# Patient Record
Sex: FEMALE | URBAN - METROPOLITAN AREA
[De-identification: names, ages, dates, MRNs, and addresses within clinical notes are randomized per-mention and may not be internally consistent; named-entity substitution may affect disease eponyms.]

---

## 2020-06-02 ENCOUNTER — APPOINTMENT (RX ONLY)
Dept: URBAN - METROPOLITAN AREA CLINIC 61 | Facility: CLINIC | Age: 70
Setting detail: DERMATOLOGY
End: 2020-06-02

## 2020-06-02 DIAGNOSIS — L30.9 DERMATITIS, UNSPECIFIED: ICD-10-CM

## 2020-06-02 DIAGNOSIS — L29.8 OTHER PRURITUS: ICD-10-CM

## 2020-06-02 DIAGNOSIS — L21.8 OTHER SEBORRHEIC DERMATITIS: ICD-10-CM

## 2020-06-02 DIAGNOSIS — L29.89 OTHER PRURITUS: ICD-10-CM

## 2020-06-02 PROCEDURE — ? PRESCRIPTION

## 2020-06-02 PROCEDURE — ? INTRAMUSCULAR KENALOG

## 2020-06-02 PROCEDURE — ? BIOPSY BY PUNCH METHOD

## 2020-06-02 PROCEDURE — 96372 THER/PROPH/DIAG INJ SC/IM: CPT | Mod: 59

## 2020-06-02 PROCEDURE — ? COUNSELING

## 2020-06-02 PROCEDURE — 11104 PUNCH BX SKIN SINGLE LESION: CPT

## 2020-06-02 PROCEDURE — 99202 OFFICE O/P NEW SF 15 MIN: CPT | Mod: 25

## 2020-06-02 RX ORDER — KETOCONAZOLE 20 MG/G
CREAM TOPICAL BID
Qty: 1 | Refills: 3 | Status: ERX | COMMUNITY
Start: 2020-06-02

## 2020-06-02 RX ORDER — HYDROCORTISONE 25 MG/G
CREAM TOPICAL
Qty: 1 | Refills: 4 | Status: ERX | COMMUNITY
Start: 2020-06-02

## 2020-06-02 RX ORDER — TRIAMCINOLONE ACETONIDE 1 MG/G
CREAM TOPICAL BID
Qty: 1 | Refills: 1 | Status: ERX | COMMUNITY
Start: 2020-06-02

## 2020-06-02 RX ADMIN — HYDROCORTISONE: 25 CREAM TOPICAL at 00:00

## 2020-06-02 RX ADMIN — KETOCONAZOLE: 20 CREAM TOPICAL at 00:00

## 2020-06-02 RX ADMIN — TRIAMCINOLONE ACETONIDE: 1 CREAM TOPICAL at 00:00

## 2020-06-02 ASSESSMENT — LOCATION DETAILED DESCRIPTION DERM
LOCATION DETAILED: RIGHT VENTRAL DISTAL FOREARM
LOCATION DETAILED: LEFT CENTRAL MALAR CHEEK
LOCATION DETAILED: RIGHT POSTERIOR SHOULDER

## 2020-06-02 ASSESSMENT — PAIN INTENSITY VAS: HOW INTENSE IS YOUR PAIN 0 BEING NO PAIN, 10 BEING THE MOST SEVERE PAIN POSSIBLE?: NO PAIN

## 2020-06-02 ASSESSMENT — LOCATION ZONE DERM
LOCATION ZONE: FACE
LOCATION ZONE: ARM

## 2020-06-02 ASSESSMENT — LOCATION SIMPLE DESCRIPTION DERM
LOCATION SIMPLE: LEFT CHEEK
LOCATION SIMPLE: RIGHT FOREARM
LOCATION SIMPLE: RIGHT SHOULDER

## 2020-06-02 ASSESSMENT — SEVERITY ASSESSMENT
SEVERITY: MILD TO MODERATE
HOW SEVERE IS THIS PATIENT'S CONDITION?: MILD

## 2020-06-02 NOTE — PROCEDURE: INTRAMUSCULAR KENALOG
Add Option For Additional Mediation: No
Administered By (Optional): Twila
Total Volume (Ccs): 1
Kenalog Preparation: kenalog
Detail Level: None
Consent: The risks of atrophy were reviewed with the patient.
Concentration (Mg/Ml): 40.0
Concentration (Mg/Ml) Of Additional Medication: 2.5

## 2020-06-16 ENCOUNTER — APPOINTMENT (RX ONLY)
Dept: URBAN - METROPOLITAN AREA CLINIC 61 | Facility: CLINIC | Age: 70
Setting detail: DERMATOLOGY
End: 2020-06-16

## 2020-06-16 DIAGNOSIS — L20.89 OTHER ATOPIC DERMATITIS: ICD-10-CM | Status: INADEQUATELY CONTROLLED

## 2020-06-16 DIAGNOSIS — Z48.817 ENCOUNTER FOR SURGICAL AFTERCARE FOLLOWING SURGERY ON THE SKIN AND SUBCUTANEOUS TISSUE: ICD-10-CM

## 2020-06-16 PROBLEM — L20.84 INTRINSIC (ALLERGIC) ECZEMA: Status: ACTIVE | Noted: 2020-06-16

## 2020-06-16 PROCEDURE — ? FULL BODY SKIN EXAM - DECLINED

## 2020-06-16 PROCEDURE — 99213 OFFICE O/P EST LOW 20 MIN: CPT

## 2020-06-16 PROCEDURE — ? PATHOLOGY DISCUSSION

## 2020-06-16 PROCEDURE — ? COUNSELING

## 2020-06-16 PROCEDURE — 99024 POSTOP FOLLOW-UP VISIT: CPT

## 2020-06-16 PROCEDURE — ? POST-OP WOUND CHECK

## 2020-06-16 ASSESSMENT — LOCATION SIMPLE DESCRIPTION DERM: LOCATION SIMPLE: RIGHT FOREARM

## 2020-06-16 ASSESSMENT — LOCATION DETAILED DESCRIPTION DERM: LOCATION DETAILED: RIGHT VENTRAL PROXIMAL FOREARM

## 2020-06-16 ASSESSMENT — LOCATION ZONE DERM: LOCATION ZONE: ARM

## 2020-06-16 NOTE — PROCEDURE: POST-OP WOUND CHECK
Detail Level: Detailed
Add 06609 Cpt? (Important Note: In 2017 The Use Of 17912 Is Being Tracked By Cms To Determine Future Global Period Reimbursement For Global Periods): yes
Wound Evaluated By: Karen

## 2020-06-16 NOTE — PROCEDURE: COUNSELING
Detail Level: Detailed
Patient Specific Counseling (Will Not Stick From Patient To Patient): Rec. hypoallergenic personal care products. \\nPatch testing with Dr. Mathews.\\nContinue topical TAC as needed for itching.

## 2020-07-22 ENCOUNTER — APPOINTMENT (RX ONLY)
Dept: URBAN - METROPOLITAN AREA CLINIC 61 | Facility: CLINIC | Age: 70
Setting detail: DERMATOLOGY
End: 2020-07-22

## 2020-07-22 DIAGNOSIS — L259 CONTACT DERMATITIS AND OTHER ECZEMA, UNSPECIFIED CAUSE: ICD-10-CM | Status: INADEQUATELY CONTROLLED

## 2020-07-22 PROBLEM — L23.9 ALLERGIC CONTACT DERMATITIS, UNSPECIFIED CAUSE: Status: ACTIVE | Noted: 2020-07-22

## 2020-07-22 PROCEDURE — ? FULL BODY SKIN EXAM - DECLINED

## 2020-07-22 PROCEDURE — ? ADDITIONAL NOTES

## 2020-07-22 PROCEDURE — ? PRESCRIPTION MEDICATION MANAGEMENT

## 2020-07-22 PROCEDURE — 99214 OFFICE O/P EST MOD 30 MIN: CPT

## 2020-07-22 PROCEDURE — ? PATIENT SPECIFIC COUNSELING

## 2020-07-22 PROCEDURE — ? COUNSELING ALLERGENS

## 2020-07-22 PROCEDURE — ? FOLLOW UP FOR NEXT VISIT

## 2020-07-22 PROCEDURE — ? EDUCATIONAL RESOURCES PROVIDED

## 2020-07-22 ASSESSMENT — LOCATION SIMPLE DESCRIPTION DERM
LOCATION SIMPLE: CHEST
LOCATION SIMPLE: LEFT FOREARM
LOCATION SIMPLE: RIGHT FOREARM

## 2020-07-22 ASSESSMENT — LOCATION DETAILED DESCRIPTION DERM
LOCATION DETAILED: LEFT PROXIMAL DORSAL FOREARM
LOCATION DETAILED: UPPER STERNUM
LOCATION DETAILED: RIGHT DISTAL DORSAL FOREARM
LOCATION DETAILED: LEFT VENTRAL PROXIMAL FOREARM
LOCATION DETAILED: RIGHT VENTRAL DISTAL FOREARM

## 2020-07-22 ASSESSMENT — SEVERITY ASSESSMENT: SEVERITY: MILD

## 2020-07-22 ASSESSMENT — BSA RASH: BSA RASH: 12

## 2020-07-22 ASSESSMENT — LOCATION ZONE DERM
LOCATION ZONE: ARM
LOCATION ZONE: TRUNK

## 2020-07-22 NOTE — PROCEDURE: ADDITIONAL NOTES
Detail Level: Generalized
Additional Notes: Back is clear and can schedule patch testing
Additional Notes: Eruption is partially photo distributed - as such carvedilol may be marking the skin more reactive - Patient is advised to protect from sun exposure

## 2020-09-22 ENCOUNTER — APPOINTMENT (RX ONLY)
Dept: URBAN - METROPOLITAN AREA CLINIC 61 | Facility: CLINIC | Age: 70
Setting detail: DERMATOLOGY
End: 2020-09-22

## 2020-09-22 DIAGNOSIS — L259 CONTACT DERMATITIS AND OTHER ECZEMA, UNSPECIFIED CAUSE: ICD-10-CM

## 2020-09-22 PROBLEM — L23.9 ALLERGIC CONTACT DERMATITIS, UNSPECIFIED CAUSE: Status: ACTIVE | Noted: 2020-09-22

## 2020-09-22 PROCEDURE — ? ACDS EXTENDED PATCH TEST READING

## 2020-09-22 PROCEDURE — ? FULL BODY SKIN EXAM - DECLINED

## 2020-09-22 PROCEDURE — 95044 PATCH/APPLICATION TESTS: CPT

## 2020-09-22 PROCEDURE — ? PATCH TESTING

## 2020-09-22 PROCEDURE — ? ADDITIONAL NOTES

## 2020-09-22 NOTE — PROCEDURE: ACDS EXTENDED PATCH TEST READING
Allergen 85 Reaction: no reaction
Name Of Allergen 21: D-044C Diazolidinylurea (Germall II)
Name Of Allergen 33: P-019B  propylene glycol
Name Of Allergen 61: P-022 Propolis (Bee's wax)
Name Of Allergen 52: D-005B  dibucaine hydrochloride
Name Of Allergen 37: I-008C Iodopropynyl butyl carbamate
Name Of Allergen 19: B-001 Myroxylon pereirae resin (Balsam of [place])
Name Of Allergen 8: Mx-06 Carba Mix
Name Of Allergen 41: T-010 Toluenesulfonamide formaldehyde resin
Name Of Allergen 12: E-005 Ethylenediamine diHCl
Name Of Allergen 1: B-004 Benzocaine
Name Of Allergen 14: C-007B Quaternium 15 (Dowicil 200)
Name Of Allergen 5: I-001A Imidazolidinyl Urea (Germall 115)
Name Of Allergen 47: H-021B Hydrocortisone-17-butyrate
Name Of Allergen 32: MX-25 Fragrance Mix II
Name Of Allergen 22: D-047B DMDM Hydratoin (formaldehyde releaser)
Name Of Allergen 49: C-018 Cocamidopropylbetaine
Name Of Allergen 48: D-012 dimethylol dihydroxyethyleneurea (Fix CPN)
Name Of Allergen 13: E-002 Epoxy resin Bisphenol A
Name Of Allergen 7: A-004  Amerchol L 101 (Lanolin , wool alcohols)
Name Of Allergen 6: C-014 Cinnamal (Cinnamic aldehyde)
Show Negative Results In The Note?: Yes
Name Of Allergen 45: B-033B Budesonide
Name Of Allergen 65: Y-001 [place] odorata oil (Ylang-Ylang Oil)
Name Of Allergen 17: I-004 n-isopropyl-n-phenyl-4 phenylenediamine
Name Of Allergen 55: A-029 amidoamine
Name Of Allergen 30: B-015B 2-bromo-2-nitropropane (Bronopol)
Name Of Allergen 28: MX-07 Fragrance Mix
Name Of Allergen 59: B-008B  Benzyl alcohol
Name Of Allergen 20: N-0028  Nickel sulfate hexahydrate
Name Of Allergen 36: MX-16  ethyleneurea, melamine formaldehyde mix
Name Of Allergen 53: D-065 decyl glucoside
Name Of Allergen 15: B-024 3-urww-Eojpuxshimelrekbrenighx resin
Detail Level: Zone
Name Of Allergen 50: M-035B Methylisothiazolinone
Name Of Allergen 24: MX-24 Mixed  dialkyl thiourea
Name Of Allergen 3: C-020 Colophonium (Colophony)
Name Of Allergen 46: Mx-29A Compositae Mix
Name Of Allergen 11: F-0028 Formaldehyde
Name Of Allergen 56: H-010 HEMA (2-hydroxyethyl methacrylate)
Name Of Allergen 31: MX-18 Sequiterpene lactone mix
Name Of Allergen 38: MX-26 Disperse Blue 106/124 Mix
Name Of Allergen 58: L-003 Hydroxyisohexyl 3 cyclohexene carboxaldehyde (Lyral)
Name Of Allergen 16: MX-05B Mercapto Mix
Name Of Allergen 39: E-004 ethyl acrylate
What Reading Time Point?: 168 hour
Name Of Allergen 62: F-003 fusidic acid sodium salt
Name Of Allergen 44: T-031B Zoubkjvwre-07-lvbybkadl
Name Of Allergen 66: M-005 mercury
Name Of Allergen 4: P-006 p-Phenyenediamine (PPD)
Name Of Allergen 25: C-009B Methylisothiazolinone+ Methylchloroisothiazolinone (Uyen HUTCHISON )
Name Of Allergen 60: T-036 Tocopherol (DL alpha tocopherol (Vitamin E)
Number Of Patches Read: 67
Name Of Allergen 18: P-014B   potassium dichromate (Chrome)
Name Of Allergen 34: H-014C  Benzophenone-3/(2-hydroxy-4-methoxybenzophenone)
Name Of Allergen 35: C-101B Chloroxylenol (PCMX)
Name Of Allergen 43: C-017A  Cobalt (II) chloride hexahydrate
Name Of Allergen 9: N-001 Neomycin sulfate
Name Of Allergen 26: MX-03A  Paraben Mix
Name Of Allergen 67: T-007  thimerosal
Name Of Allergen 2: M-0038 2 Mercaptobenothiazole MBT
Name Of Allergen 64: T-035B  tea Tree Oil
Name Of Allergen 27: D-049E Methyldibromoglutaronitrile (MDBGN)
Name Of Allergen 51: L-002B Lidocaine
Name Of Allergen 10: MX-01 Zainab Mix
Name Of Allergen 54: C-028 Clobetasol-17-propionate
Name Of Allergen 29: G-003B Glutaral (glutaraldehyde)
Name Of Allergen 40: G-004 Glyceryl monothioglycolate
Name Of Allergen 23: B-032B  Bacitracin
Name Of Allergen 42: M-013 Methyl methacrylate
Name Of Allergen 57: D-057  Desoximethasone
Name Of Allergen 63: C-019 cocamide TANGELA (Coconut diethanolamide)

## 2020-09-22 NOTE — PROCEDURE: PATCH TESTING
Consent: Written consent obtained, risks reviewed including but not limited to rash, itching, allergic reaction, systemic rash, remote possiblity of anaphylaxis to allergen.
Post-Care Instructions: I reviewed with the patient in detail post-care instructions. Patient should not sweat, pick at, or get the patches wet for 48 hours.
Detail Level: Zone
Number Of Patches (Maximum Allowable Per Dos By Cms Is 90): 67

## 2020-09-24 ENCOUNTER — APPOINTMENT (RX ONLY)
Dept: URBAN - METROPOLITAN AREA CLINIC 61 | Facility: CLINIC | Age: 70
Setting detail: DERMATOLOGY
End: 2020-09-24

## 2020-09-24 DIAGNOSIS — L259 CONTACT DERMATITIS AND OTHER ECZEMA, UNSPECIFIED CAUSE: ICD-10-CM

## 2020-09-24 PROBLEM — L23.9 ALLERGIC CONTACT DERMATITIS, UNSPECIFIED CAUSE: Status: ACTIVE | Noted: 2020-09-24

## 2020-09-24 PROCEDURE — ? FULL BODY SKIN EXAM - DECLINED

## 2020-09-24 PROCEDURE — ? ADDITIONAL NOTES

## 2020-09-24 PROCEDURE — ? PATCH TEST REMOVAL

## 2020-09-24 PROCEDURE — ? ACDS EXTENDED PATCH TEST READING

## 2020-09-24 PROCEDURE — ? COUNSELING

## 2020-09-24 PROCEDURE — 99212 OFFICE O/P EST SF 10 MIN: CPT

## 2020-09-24 ASSESSMENT — LOCATION ZONE DERM: LOCATION ZONE: TRUNK

## 2020-09-24 ASSESSMENT — LOCATION SIMPLE DESCRIPTION DERM
LOCATION SIMPLE: LEFT UPPER BACK
LOCATION SIMPLE: RIGHT UPPER BACK

## 2020-09-24 ASSESSMENT — LOCATION DETAILED DESCRIPTION DERM
LOCATION DETAILED: RIGHT MID-UPPER BACK
LOCATION DETAILED: LEFT INFERIOR UPPER BACK

## 2020-09-24 NOTE — PROCEDURE: PATCH TEST REMOVAL
Patch Test Removal Text: The patch test material was removed from the back today. The patch test reading performed after 30 minute rest
Detail Level: None

## 2020-09-24 NOTE — PROCEDURE: ACDS EXTENDED PATCH TEST READING
Allergen 18 Reaction: no reaction
Name Of Allergen 25: C-009B Methylisothiazolinone+ Methylchloroisothiazolinone (Uyen HUTCHISON )
Name Of Allergen 37: I-008C Iodopropynyl butyl carbamate
Name Of Allergen 67: T-007  thimerosal
Name Of Allergen 13: E-002 Epoxy resin Bisphenol A
Name Of Allergen 45: B-033B Budesonide
Name Of Allergen 5: I-001A Imidazolidinyl Urea (Germall 115)
Name Of Allergen 26: MX-03A  Paraben Mix
Name Of Allergen 55: A-029 amidoamine
Name Of Allergen 63: C-019 cocamide TANGELA (Coconut diethanolamide)
Name Of Allergen 58: L-003 Hydroxyisohexyl 3 cyclohexene carboxaldehyde (Lyral)
Name Of Allergen 3: C-020 Colophonium (Colophony)
Name Of Allergen 20: N-0028  Nickel sulfate hexahydrate
Name Of Allergen 10: MX-01 Zainab Mix
Name Of Allergen 50: M-035B Methylisothiazolinone
Name Of Allergen 39: E-004 ethyl acrylate
Number Of Patches Read: 67
Name Of Allergen 54: C-028 Clobetasol-17-propionate
Name Of Allergen 35: C-101B Chloroxylenol (PCMX)
Name Of Allergen 33: P-019B  propylene glycol
Name Of Allergen 44: T-031B Xmkanmudsq-59-ftomvuart
Name Of Allergen 30: B-015B 2-bromo-2-nitropropane (Bronopol)
Name Of Allergen 24: MX-24 Mixed  dialkyl thiourea
Name Of Allergen 11: F-0028 Formaldehyde
Name Of Allergen 46: Mx-29A Compositae Mix
Name Of Allergen 62: F-003 fusidic acid sodium salt
Name Of Allergen 49: C-018 Cocamidopropylbetaine
Name Of Allergen 18: P-014B   potassium dichromate (Chrome)
Name Of Allergen 40: G-004 Glyceryl monothioglycolate
Name Of Allergen 57: D-057  Desoximethasone
Name Of Allergen 65: Y-001 [place] odorata oil (Ylang-Ylang Oil)
Name Of Allergen 1: B-004 Benzocaine
Name Of Allergen 38: MX-26 Disperse Blue 106/124 Mix
Name Of Allergen 52: D-005B  dibucaine hydrochloride
Name Of Allergen 43: C-017A  Cobalt (II) chloride hexahydrate
Name Of Allergen 60: T-036 Tocopherol (DL alpha tocopherol (Vitamin E)
Show Negative Results In The Note?: Yes
Name Of Allergen 8: Mx-06 Carba Mix
Name Of Allergen 34: H-014C  Benzophenone-3/(2-hydroxy-4-methoxybenzophenone)
Name Of Allergen 64: T-035B  tea Tree Oil
Name Of Allergen 42: M-013 Methyl methacrylate
Name Of Allergen 2: M-0038 2 Mercaptobenothiazole MBT
Name Of Allergen 15: B-024 7-ovez-Qnuzohcnonberhumfauiinr resin
Name Of Allergen 4: P-006 p-Phenyenediamine (PPD)
Name Of Allergen 51: L-002B Lidocaine
Name Of Allergen 48: D-012 dimethylol dihydroxyethyleneurea (Fix CPN)
Name Of Allergen 59: B-008B  Benzyl alcohol
Name Of Allergen 36: MX-16  ethyleneurea, melamine formaldehyde mix
Name Of Allergen 9: N-001 Neomycin sulfate
Name Of Allergen 19: B-001 Myroxylon pereirae resin (Balsam of [place])
Name Of Allergen 23: B-032B  Bacitracin
Name Of Allergen 16: MX-05B Mercapto Mix
Detail Level: Zone
Name Of Allergen 66: M-005 mercury
Name Of Allergen 6: C-014 Cinnamal (Cinnamic aldehyde)
Name Of Allergen 29: G-003B Glutaral (glutaraldehyde)
Name Of Allergen 7: A-004  Amerchol L 101 (Lanolin , wool alcohols)
Name Of Allergen 53: D-065 decyl glucoside
Name Of Allergen 47: H-021B Hydrocortisone-17-butyrate
Name Of Allergen 17: I-004 n-isopropyl-n-phenyl-4 phenylenediamine
Name Of Allergen 41: T-010 Toluenesulfonamide formaldehyde resin
Name Of Allergen 32: MX-25 Fragrance Mix II
Name Of Allergen 21: D-044C Diazolidinylurea (Germall II)
Name Of Allergen 61: P-022 Propolis (Bee's wax)
Name Of Allergen 14: C-007B Quaternium 15 (Dowicil 200)
What Reading Time Point?: 48 hour
Name Of Allergen 27: D-049E Methyldibromoglutaronitrile (MDBGN)
Name Of Allergen 12: E-005 Ethylenediamine diHCl
Name Of Allergen 28: MX-07 Fragrance Mix
Name Of Allergen 31: MX-18 Sequiterpene lactone mix
Name Of Allergen 22: D-047B DMDM Hydratoin (formaldehyde releaser)
Name Of Allergen 56: H-010 HEMA (2-hydroxyethyl methacrylate)

## 2020-09-25 ENCOUNTER — APPOINTMENT (RX ONLY)
Dept: URBAN - METROPOLITAN AREA CLINIC 61 | Facility: CLINIC | Age: 70
Setting detail: DERMATOLOGY
End: 2020-09-25

## 2020-09-25 DIAGNOSIS — L259 CONTACT DERMATITIS AND OTHER ECZEMA, UNSPECIFIED CAUSE: ICD-10-CM

## 2020-09-25 PROBLEM — L23.9 ALLERGIC CONTACT DERMATITIS, UNSPECIFIED CAUSE: Status: ACTIVE | Noted: 2020-09-25

## 2020-09-25 PROCEDURE — ? FULL BODY SKIN EXAM - DECLINED

## 2020-09-25 PROCEDURE — 99213 OFFICE O/P EST LOW 20 MIN: CPT

## 2020-09-25 PROCEDURE — ? COUNSELING

## 2020-09-25 PROCEDURE — ? ADDITIONAL NOTES

## 2020-09-25 PROCEDURE — ? ACDS EXTENDED PATCH TEST READING

## 2020-09-25 ASSESSMENT — LOCATION DETAILED DESCRIPTION DERM
LOCATION DETAILED: LEFT MID-UPPER BACK
LOCATION DETAILED: RIGHT MID-UPPER BACK

## 2020-09-25 ASSESSMENT — LOCATION SIMPLE DESCRIPTION DERM
LOCATION SIMPLE: LEFT UPPER BACK
LOCATION SIMPLE: RIGHT UPPER BACK

## 2020-09-25 ASSESSMENT — LOCATION ZONE DERM: LOCATION ZONE: TRUNK

## 2020-09-25 NOTE — PROCEDURE: ACDS EXTENDED PATCH TEST READING
Allergen 87 Reaction: no reaction
Name Of Allergen 47: H-021B Hydrocortisone-17-butyrate
Show Allergen Counseling In The Note?: Yes
Name Of Allergen 12: E-005 Ethylenediamine diHCl
Name Of Allergen 54: C-028 Clobetasol-17-propionate
Name Of Allergen 27: D-049E Methyldibromoglutaronitrile (MDBGN)
Name Of Allergen 63: C-019 cocamide TANGELA (Coconut diethanolamide)
Name Of Allergen 19: B-001 Myroxylon pereirae resin (Balsam of [place])
Name Of Allergen 61: P-022 Propolis (Bee's wax)
Name Of Allergen 34: H-014C  Benzophenone-3/(2-hydroxy-4-methoxybenzophenone)
Name Of Allergen 55: A-029 amidoamine
Name Of Allergen 56: H-010 HEMA (2-hydroxyethyl methacrylate)
Name Of Allergen 46: Mx-29A Compositae Mix
Name Of Allergen 8: Mx-06 Carba Mix
Name Of Allergen 1: B-004 Benzocaine
Name Of Allergen 62: F-003 fusidic acid sodium salt
Name Of Allergen 4: P-006 p-Phenyenediamine (PPD)
Name Of Allergen 17: I-004 n-isopropyl-n-phenyl-4 phenylenediamine
Name Of Allergen 43: C-017A  Cobalt (II) chloride hexahydrate
Name Of Allergen 51: L-002B Lidocaine
Name Of Allergen 23: B-032B  Bacitracin
Name Of Allergen 35: C-101B Chloroxylenol (PCMX)
Name Of Allergen 5: I-001A Imidazolidinyl Urea (Germall 115)
Name Of Allergen 21: D-044C Diazolidinylurea (Germall II)
Name Of Allergen 29: G-003B Glutaral (glutaraldehyde)
Name Of Allergen 66: M-005 mercury
Name Of Allergen 50: M-035B Methylisothiazolinone
Name Of Allergen 30: B-015B 2-bromo-2-nitropropane (Bronopol)
Name Of Allergen 37: I-008C Iodopropynyl butyl carbamate
Name Of Allergen 53: D-065 decyl glucoside
Name Of Allergen 28: MX-07 Fragrance Mix
Name Of Allergen 57: D-057  Desoximethasone
Name Of Allergen 33: P-019B  propylene glycol
Name Of Allergen 36: MX-16  ethyleneurea, melamine formaldehyde mix
Name Of Allergen 13: E-002 Epoxy resin Bisphenol A
Name Of Allergen 44: T-031B Rnsjvrryzu-82-txqvipkuf
Name Of Allergen 6: C-014 Cinnamal (Cinnamic aldehyde)
Name Of Allergen 9: N-001 Neomycin sulfate
Name Of Allergen 11: F-0028 Formaldehyde
Name Of Allergen 42: M-013 Methyl methacrylate
Name Of Allergen 48: D-012 dimethylol dihydroxyethyleneurea (Fix CPN)
Number Of Patches Read: 67
Name Of Allergen 7: A-004  Amerchol L 101 (Lanolin , wool alcohols)
Name Of Allergen 14: C-007B Quaternium 15 (Dowicil 200)
Allergen 27 Reaction: irritant reaction
Name Of Allergen 25: C-009B Methylisothiazolinone+ Methylchloroisothiazolinone (Uyen HUTCHISON )
Name Of Allergen 18: P-014B   potassium dichromate (Chrome)
Name Of Allergen 32: MX-25 Fragrance Mix II
Name Of Allergen 24: MX-24 Mixed  dialkyl thiourea
Name Of Allergen 10: MX-01 Zainab Mix
Name Of Allergen 22: D-047B DMDM Hydratoin (formaldehyde releaser)
Name Of Allergen 60: T-036 Tocopherol (DL alpha tocopherol (Vitamin E)
Name Of Allergen 2: M-0038 2 Mercaptobenothiazole MBT
Name Of Allergen 45: B-033B Budesonide
Name Of Allergen 16: MX-05B Mercapto Mix
What Reading Time Point?: 72 hour
Name Of Allergen 3: C-020 Colophonium (Colophony)
Name Of Allergen 15: B-024 8-vmym-Pittewfaesudokzkcjqjrpa resin
Name Of Allergen 64: T-035B  tea Tree Oil
Name Of Allergen 38: MX-26 Disperse Blue 106/124 Mix
Name Of Allergen 67: T-007  thimerosal
Name Of Allergen 58: L-003 Hydroxyisohexyl 3 cyclohexene carboxaldehyde (Lyral)
Name Of Allergen 20: N-0028  Nickel sulfate hexahydrate
Name Of Allergen 40: G-004 Glyceryl monothioglycolate
Name Of Allergen 52: D-005B  dibucaine hydrochloride
Name Of Allergen 59: B-008B  Benzyl alcohol
Name Of Allergen 31: MX-18 Sequiterpene lactone mix
Name Of Allergen 41: T-010 Toluenesulfonamide formaldehyde resin
Name Of Allergen 39: E-004 ethyl acrylate
Name Of Allergen 65: Y-001 [place] odorata oil (Ylang-Ylang Oil)
Detail Level: Zone
Name Of Allergen 49: C-018 Cocamidopropylbetaine
Name Of Allergen 26: MX-03A  Paraben Mix

## 2020-09-29 ENCOUNTER — APPOINTMENT (RX ONLY)
Dept: URBAN - METROPOLITAN AREA CLINIC 61 | Facility: CLINIC | Age: 70
Setting detail: DERMATOLOGY
End: 2020-09-29

## 2020-09-29 DIAGNOSIS — L259 CONTACT DERMATITIS AND OTHER ECZEMA, UNSPECIFIED CAUSE: ICD-10-CM

## 2020-09-29 PROBLEM — L23.9 ALLERGIC CONTACT DERMATITIS, UNSPECIFIED CAUSE: Status: ACTIVE | Noted: 2020-09-29

## 2020-09-29 PROCEDURE — ? ADDITIONAL NOTES

## 2020-09-29 PROCEDURE — ? PATIENT SPECIFIC COUNSELING

## 2020-09-29 PROCEDURE — ? EDUCATIONAL RESOURCES PROVIDED

## 2020-09-29 PROCEDURE — ? PRESCRIPTION

## 2020-09-29 PROCEDURE — ? FULL BODY SKIN EXAM - DECLINED

## 2020-09-29 PROCEDURE — 99213 OFFICE O/P EST LOW 20 MIN: CPT

## 2020-09-29 PROCEDURE — ? COUNSELING ALLERGENS

## 2020-09-29 PROCEDURE — ? ACDS EXTENDED PATCH TEST READING

## 2020-09-29 PROCEDURE — ? FOLLOW UP FOR NEXT VISIT

## 2020-09-29 RX ORDER — FLUOCINOLONE ACETONIDE 0.25 MG/G
OINTMENT TOPICAL BID
Qty: 1 | Refills: 3 | Status: ERX | COMMUNITY
Start: 2020-09-29

## 2020-09-29 RX ORDER — CYPROHEPTADINE HYDROCHLORIDE 4 MG/1
TABLET ORAL
Qty: 90 | Refills: 2 | Status: ERX | COMMUNITY
Start: 2020-09-29

## 2020-09-29 RX ADMIN — CYPROHEPTADINE HYDROCHLORIDE: 4 TABLET ORAL at 00:00

## 2020-09-29 RX ADMIN — FLUOCINOLONE ACETONIDE: 0.25 OINTMENT TOPICAL at 00:00

## 2020-09-29 ASSESSMENT — LOCATION SIMPLE DESCRIPTION DERM
LOCATION SIMPLE: LEFT UPPER BACK
LOCATION SIMPLE: CHEST
LOCATION SIMPLE: RIGHT UPPER ARM

## 2020-09-29 ASSESSMENT — LOCATION ZONE DERM
LOCATION ZONE: ARM
LOCATION ZONE: TRUNK
LOCATION ZONE: TRUNK

## 2020-09-29 ASSESSMENT — LOCATION DETAILED DESCRIPTION DERM
LOCATION DETAILED: RIGHT ANTERIOR DISTAL UPPER ARM
LOCATION DETAILED: LEFT MID-UPPER BACK
LOCATION DETAILED: UPPER STERNUM

## 2020-09-29 NOTE — PROCEDURE: ACDS EXTENDED PATCH TEST READING
Allergen 43 Counseling: PATIENT INFORMATION SHEET\\nCobalt(II)chloride hexahydrate\\n(C-017A, C-017B)\\nYour patch testing results indicate that you have a contact allergy to Cobalt(II)chloride\\nhexahydrate. It is important that you familiarize yourself with this chemical and take steps\\nto avoid coming in contact with it.\\nWhat is Cobalt(II)chloride hexahydrate and where is it found?\\nCobalt is a silver-gray, magnetic, brittle metal which is used to produce blue colors in\\npottery, glass, porcelain and enamel and is also combined with other metals to make\\nmetal alloys. The most common source of exposure is nickel- plated objects which\\nalways contain cobalt. A positive reaction to cobalt therefore often accompanies nickel\\nsensitivity. Further research may identify additional product or industrial usages of this\\nchemical.\\nWhat else is Cobalt(II)chloride hexahydrate called?\\nThis chemical can be identified by different names, including:\\nCobalt Dichloride Hexahydrate, Cobalt Blue, Cobaltous , Cobaltous chloride, hexahydrate, Chloride Hexahydrate\\nThis may not be a complete list as manufacturers introduce and delete chemicals from their product lines.\\nTHINGS YOU CAN DO TO HELP MANAGE YOUR CONTACT ALLERGY\\n Be vigilant... read the product label. Always take the time to read the ingredient listing on product packages.\\nThis should be your first step each time you purchase a product as manufacturers sometimes change product\\ningredients. If you have any concerns ask your pharmacist or your doctor.\\nTest the product first. If you have purchased a new product you should test it on a small skin area to see if you\\nget a reaction before using the product on larger skin areas.\\nAdvise people you obtain services from of your contact allergy. This should include people like your\\npharmacist, doctor, hairdresser, , , etc.\\nInform your employer if the source of your contact allergy is work related. You should identify the specific\\nsource of the chemical and take the necessary steps to avoid further exposure. Protective wear may be\\nadequate or you may need to make a change in your work activities. Both you and your employer benefit when\\nthe cause of your occupational dermatitis is eliminated.\\n\"Google\" it. The internet is an excellent source of ingredient information that can be searched by product, by\\ncompany and by specific chemical. Some helpful independent internet links\\ninclude: www.nlm.nih.gov/pubs/factsheets/factsheets.html (U.S. Dept. of Health and Human Services;\\nalphabetic list) www.nlm.nih.gov/pubs/factsheets/factsubj.html (U.S. Dept. of Health and Human Services;\\nsubject list) www.cosmeticsinfo.org (Cosmetic Industry Category Ingredient\\nDatabase) www.whatsinsidescjohnson.com (information on all [employer] product ingredients)\\nIf you have any future contact dermatitis concerns or questions, please call the doctor's office.\\nDISCLAIMER: Every effort is made to ensure the accuracy of the information provided herein. However, [employer] makes no warranties or representations of any kind as to its accuracy, currency or completeness. Such\\ninformation is provided for informational purposes only and is not meant to be a substitute for physician or health professional\\nadvice.\\nPowered by [employer] (www.tcpdf.org)

## 2020-09-29 NOTE — PROCEDURE: ACDS EXTENDED PATCH TEST READING
Allergen 19 Counseling: PATIENT INFORMATION SHEET\\n[place] balsam\\n(B-001)\\nYour patch testing results indicate that you have a contact allergy to [place] balsam. It is\\nimportant that you familiarize yourself with this chemical and take steps to avoid coming\\cristóbal contact with it.\\nWhat is [place] balsam and where is it found?\\nBalsam of [place] is an aromatic liquid mainly used for flavoring in food and drink,\\nfragrance in perfumes and as an ingredient in medicinal products. It can be found in\\nperfumes, deodorants, after shave lotions, cosmetics baby powders, sunscreens,\\nshampoos and conditioners, artificially baked goods, soft drinks, aperitifs, ointments,\\nwound spray, lozenges, surgical dressings, toothpaste and mouthwash. Further research\\nmay identify additional product or industrial usages of this chemical.\\nWhat else is [place] balsam called?\\nThis chemical can be identified by different names, including:\\nBalsam of [place], [place] balsam, Bel[place]ian balsam, Balsamum peruvianim,  balsam, [place] balsam, Balsams, [place],\\nMyroxylon pereirae klotzsch resin, [place] balsam oil, Balsam [place] oil, Myrosperum galvez balsam, [place] balsam, Black\\nbalsam, MYROXYLON PEREIRAE RESIN, Myroxylon pereirae klotzsch oil, Toluifera galvez balsam, [place] oil, Oil balsam\\nperu\\nThis may not be a complete list as manufacturers introduce and delete chemicals from their product lines.\\nTHINGS YOU CAN DO TO HELP MANAGE YOUR CONTACT ALLERGY\\n Be vigilant... read the product label. Always take the time to read the ingredient listing on product packages.\\nThis should be your first step each time you purchase a product as manufacturers sometimes change product\\ningredients. If you have any concerns ask your pharmacist or your doctor.\\nTest the product first. If you have purchased a new product you should test it on a small skin area to see if you\\nget a reaction before using the product on larger skin areas.\\nAdvise people you obtain services from of your contact allergy. This should include people like your\\npharmacist, doctor, hairdresser, , , etc.\\nInform your employer if the source of your contact allergy is work related. You should identify the specific\\nsource of the chemical and take the necessary steps to avoid further exposure. Protective wear may be\\nadequate or you may need to make a change in your work activities. Both you and your employer benefit when\\nthe cause of your occupational dermatitis is eliminated.\\n\"Google\" it. The internet is an excellent source of ingredient information that can be searched by product, by\\ncompany and by specific chemical. Some helpful independent internet links\\ninclude: www.nlm.nih.gov/pubs/factsheets/factsheets.html (U.S. Dept. of Health and Human Services;\\nalphabetic list) www.nlm.nih.gov/pubs/factsheets/factsubj.html (U.S. Dept. of Health and Human Services;\\nsubject list) www.cosmeticsinfo.org (Cosmetic Industry Category Ingredient\\nDatabase) www.[employer].com (information on all [employer] product ingredients)\\nIf you have any future contact dermatitis concerns or questions, please call the doctor's office.\\nDISCLAIMER: Every effort is made to ensure the accuracy of the information provided herein. However, [employer] makes no warranties or representations of any kind as to its accuracy, currency or completeness. Such\\ninformation is provided for informational purposes only and is not meant to be a substitute for physician or health professional\\nadvice.\\nPowered by [employer] (www.tcpdf.org) Allergen 19 Counseling: PATIENT INFORMATION SHEET\\n[place] balsam\\n(B-001)\\nYour patch testing results indicate that you have a contact allergy to [place] balsam. It is\\nimportant that you familiarize yourself with this chemical and take steps to avoid coming\\cristóbal contact with it.\\nWhat is [place] balsam and where is it found?\\nBalsam of [place] is an aromatic liquid mainly used for flavoring in food and drink,\\nfragrance in perfumes and as an ingredient in medicinal products. It can be found in\\nperfumes, deodorants, after shave lotions, cosmetics baby powders, sunscreens,\\nshampoos and conditioners, artificially baked goods, soft drinks, aperitifs, ointments,\\nwound spray, lozenges, surgical dressings, toothpaste and mouthwash. Further research\\nmay identify additional product or industrial usages of this chemical.\\nWhat else is [place] balsam called?\\nThis chemical can be identified by different names, including:\\nBalsam of [place], [place] balsam, [place] balsam, Balsamum peruvianim,  balsam, [place] balsam, Balsams, [place],\\nMyroxylon pereirae klotzsch resin, [place] balsam oil, Balsam [place] oil, Myrosperum galvez balsam, [place] balsam, Black\\nbalsam, MYROXYLON PEREIRAE RESIN, Myroxylon pereirae klotzsch oil, Toluifera galvez balsam, [place] oil, Oil balsam\\nperu\\nThis may not be a complete list as manufacturers introduce and delete chemicals from their product lines.\\nTHINGS YOU CAN DO TO HELP MANAGE YOUR CONTACT ALLERGY\\n Be vigilant... read the product label. Always take the time to read the ingredient listing on product packages.\\nThis should be your first step each time you purchase a product as manufacturers sometimes change product\\ningredients. If you have any concerns ask your pharmacist or your doctor.\\nTest the product first. If you have purchased a new product you should test it on a small skin area to see if you\\nget a reaction before using the product on larger skin areas.\\nAdvise people you obtain services from of your contact allergy. This should include people like your\\npharmacist, doctor, hairdresser, , , etc.\\nInform your employer if the source of your contact allergy is work related. You should identify the specific\\nsource of the chemical and take the necessary steps to avoid further exposure. Protective wear may be\\nadequate or you may need to make a change in your work activities. Both you and your employer benefit when\\nthe cause of your occupational dermatitis is eliminated.\\n\"Google\" it. The internet is an excellent source of ingredient information that can be searched by product, by\\ncompany and by specific chemical. Some helpful independent internet links\\ninclude: www.nlm.nih.gov/pubs/factsheets/factsheets.html (U.S. Dept. of Health and Human Services;\\nalphabetic list) www.nlm.nih.gov/pubs/factsheets/factsubj.html (U.S. Dept. of Health and Human Services;\\nsubject list) www.cosmeticsinfo.org (Cosmetic Industry Category Ingredient\\nDatabase) www.[employer].com (information on all [employer] product ingredients)\\nIf you have any future contact dermatitis concerns or questions, please call the doctor's office.\\nDISCLAIMER: Every effort is made to ensure the accuracy of the information provided herein. However, [employer] makes no warranties or representations of any kind as to its accuracy, currency or completeness. Such\\ninformation is provided for informational purposes only and is not meant to be a substitute for physician or health professional\\nadvice.\\nPowered by [employer] (www.tcpdf.org)

## 2020-09-29 NOTE — PROCEDURE: ACDS EXTENDED PATCH TEST READING
Allergen 44 Counseling: PATIENT INFORMATION SHEET\\nTixocortol-21-pivalate\\n(T-031B)\\nYour patch testing results indicate that you have a contact allergy\\nto Tixocortol-21-pivalate . It is important that you familiarize yourself with this chemical\\nand take steps to avoid coming in contact with it.\\nWhat is Tixocortol-21-pivalate and where is it found?\\nThis chemical is an anti inflammatory topical corticosteroid used in nasal sprays for the\\ntreatment of rhinitis, pharyngitis and ulcerative colitis. It is also the key screening\\nsubstance for contact allergies to class A steroids. Further research may identify\\nadditional product or industrial usages of this chemical.\\nWhat else is Tixocortol-21-pivalate called?\\nThis chemical can be identified by different names, including:\\m48mvlk,60-Ssmahnktj-62-safhuujkworng-4-gty-3,20-, Pivalone, jb 21-pivalate, Procolon,\\n11b)-21-[(2,2-dimethyl-1-oxo-propyl)thio]-11,17-, Rectovalone, olegjyrrqmubic-3-vwy-3,20-jb, Tivalon-NT, THOR 1016,\\nTiprederm\\nThis may not be a complete list as manufacturers introduce and delete chemicals from their product lines.\\nTHINGS YOU CAN DO TO HELP MANAGE YOUR CONTACT ALLERGY\\n Be vigilant... read the product label. Always take the time to read the ingredient listing on product packages.\\nThis should be your first step each time you purchase a product as manufacturers sometimes change product\\ningredients. If you have any concerns ask your pharmacist or your doctor.\\nTest the product first. If you have purchased a new product you should test it on a small skin area to see if you\\nget a reaction before using the product on larger skin areas.\\nAdvise people you obtain services from of your contact allergy. This should include people like your\\npharmacist, doctor, hairdresser, , , etc.\\nInform your employer if the source of your contact allergy is work related. You should identify the specific\\nsource of the chemical and take the necessary steps to avoid further exposure. Protective wear may be\\nadequate or you may need to make a change in your work activities. Both you and your employer benefit when\\nthe cause of your occupational dermatitis is eliminated.\\n\"Google\" it. The internet is an excellent source of ingredient information that can be searched by product, by\\ncompany and by specific chemical. Some helpful independent internet links\\ninclude: www.nlm.nih.gov/pubs/factsheets/factsheets.html (U.S. Dept. of Health and Human Services;\\nalphabetic list) www.nlm.nih.gov/pubs/factsheets/factsubj.html (U.S. Dept. of Health and Human Services;\\nsubject list) www.cosmeticsinfo.org (Cosmetic Industry Category Ingredient\\nDatabase) www.whatsinsidescjohnson.com (information on all [employer] product ingredients)\\nIf you have any future contact dermatitis concerns or questions, please call the doctor's office.\\nDISCLAIMER: Every effort is made to ensure the accuracy of the information provided herein. However, [employer] makes no warranties or representations of any kind as to its accuracy, currency or completeness. Such\\ninformation is provided for informational purposes only and is not meant to be a substitute for physician or health professional\\nadvice.\\nPowered by [employer] (www.tcpdf.org) Allergen 44 Counseling: PATIENT INFORMATION SHEET\\nTixocortol-21-pivalate\\n(T-031B)\\nYour patch testing results indicate that you have a contact allergy\\nto Tixocortol-21-pivalate . It is important that you familiarize yourself with this chemical\\nand take steps to avoid coming in contact with it.\\nWhat is Tixocortol-21-pivalate and where is it found?\\nThis chemical is an anti inflammatory topical corticosteroid used in nasal sprays for the\\ntreatment of rhinitis, pharyngitis and ulcerative colitis. It is also the key screening\\nsubstance for contact allergies to class A steroids. Further research may identify\\nadditional product or industrial usages of this chemical.\\nWhat else is Tixocortol-21-pivalate called?\\nThis chemical can be identified by different names, including:\\t30hbiv,50-Gvdxlvnzw-12-ddwudtlfuwfzu-4-idm-3,20-, Pivalone, jb 21-pivalate, Procolon,\\n11b)-21-[(2,2-dimethyl-1-oxo-propyl)thio]-11,17-, Rectovalone, andfpjxtsaeita-6-pjo-3,20-jb, Tivalon-NT, THOR 1016,\\nTiprederm\\nThis may not be a complete list as manufacturers introduce and delete chemicals from their product lines.\\nTHINGS YOU CAN DO TO HELP MANAGE YOUR CONTACT ALLERGY\\n Be vigilant... read the product label. Always take the time to read the ingredient listing on product packages.\\nThis should be your first step each time you purchase a product as manufacturers sometimes change product\\ningredients. If you have any concerns ask your pharmacist or your doctor.\\nTest the product first. If you have purchased a new product you should test it on a small skin area to see if you\\nget a reaction before using the product on larger skin areas.\\nAdvise people you obtain services from of your contact allergy. This should include people like your\\npharmacist, doctor, hairdresser, , , etc.\\nInform your employer if the source of your contact allergy is work related. You should identify the specific\\nsource of the chemical and take the necessary steps to avoid further exposure. Protective wear may be\\nadequate or you may need to make a change in your work activities. Both you and your employer benefit when\\nthe cause of your occupational dermatitis is eliminated.\\n\"Google\" it. The internet is an excellent source of ingredient information that can be searched by product, by\\ncompany and by specific chemical. Some helpful independent internet links\\ninclude: www.nlm.nih.gov/pubs/factsheets/factsheets.html (U.S. Dept. of Health and Human Services;\\nalphabetic list) www.nlm.nih.gov/pubs/factsheets/factsubj.html (U.S. Dept. of Health and Human Services;\\nsubject list) www.cosmeticsinfo.org (Cosmetic Industry Category Ingredient\\nDatabase) www.whatsinsidescjohnson.com (information on all [employer] product ingredients)\\nIf you have any future contact dermatitis concerns or questions, please call the doctor's office.\\nDISCLAIMER: Every effort is made to ensure the accuracy of the information provided herein. However, [employer] makes no warranties or representations of any kind as to its accuracy, currency or completeness. Such\\ninformation is provided for informational purposes only and is not meant to be a substitute for physician or health professional\\nadvice.\\nPowered by [employer] (www.tcpdf.org)

## 2020-09-29 NOTE — PROCEDURE: ACDS EXTENDED PATCH TEST READING
Allergen 9 Counseling: PATIENT INFORMATION SHEET\\nBENZYL ALCOHOL\\n(B-008, B-008B)\\nYour patch testing results indicate that you have a contact allergy to BENZYL\\nALCOHOL. It is important that you familiarize yourself with this chemical and take steps\\nto avoid coming in contact with it.\\nWhat is BENZYL ALCOHOL and where is it found?\\nThis compound is used in photographic development, perfumes, the food industry,\\npharmaceuticals as a bacterio-static, cosmetics, ointments, emulsions, textiles, sheet\\nplastics and inks. It is also used as a solvent for dyestuffs, cellulose esters, cellulose\\nacetate, casein, gelatin, waxes, shellac and insect repellents. Further research may\\nidentify additional product or industrial usages of this chemical.\\nWhat else is BENZYL ALCOHOL called?\\nThis chemical can be identified by different names, including:\\nAlpha‐Hydroxytoluene\\nAlpha‐toluenol\\nBenzoyl alcohol\\nBenzenemethanol\\nBenzenecarbinol\\nHydroxytoluene\\n(hydroxymethyl)benzene\\nPhenylcarbinol\\nPhenylmethyl alcohol\\nPhenylmethanol\\nThis may not be a complete list as manufacturers introduce and delete chemicals from their product lines.\\nTHINGS YOU CAN DO TO HELP MANAGE YOUR CONTACT ALLERGY\\n Be vigilant... read the product label. Always take the time to read the ingredient listing on product packages.\\nThis should be your first step each time you purchase a product as manufacturers sometimes change product\\ningredients. If you have any concerns ask your pharmacist or your doctor.\\nTest the product first. If you have purchased a new product you should test it on a small skin area to see if you\\nget a reaction before using the product on larger skin areas.\\nAdvise people you obtain services from of your contact allergy. This should include people like your\\npharmacist, doctor, hairdresser, , , etc.\\nInform your employer if the source of your contact allergy is work related. You should identify the specific\\nsource of the chemical and take the necessary steps to avoid further exposure. Protective wear may be\\nadequate or you may need to make a change in your work activities. Both you and your employer benefit when\\nthe cause of your occupational dermatitis is eliminated.\\n\"Google\" it. The internet is an excellent source of ingredient information that can be searched by product, by\\ncompany and by specific chemical. Some helpful independent internet links\\ninclude: www.nlm.nih.gov/pubs/factsheets/factsheets.html (U.S. Dept. of Health and Human Services;\\nalphabetic list) www.nlm.nih.gov/pubs/factsheets/factsubj.html (U.S. Dept. of Health and Human Services;\\nsubject list) www.cosmeticsinfo.org (Cosmetic Industry Category Ingredient\\nDatabase) www.whatsinsidescjohnson.com (information on all [employer] product ingredients)\\nIf you have any future contact dermatitis concerns or questions, please call the doctor's office.\\nDISCLAIMER: Every effort is made to ensure the accuracy of the information provided herein. However, [employer] makes no warranties or representations of any kind as to its accuracy, currency or completeness. Such\\ninformation is provided for informational purposes only and is not meant to be a substitute for physician or health professional\\nadvice.\\nPowered by [employer] (www.tcpdf.org)

## 2020-09-29 NOTE — PROCEDURE: ACDS EXTENDED PATCH TEST READING
Allergen 28 Counseling: PATIENT INFORMATION SHEET\\nFragrance mix I\\n(Mx-07)\\nYour patch testing results indicate that you have a contact allergy to Fragrance mix I . It\\nis important that you familiarize yourself with this chemical and take steps to avoid\\ncoming in contact with it.\\nWhat is Fragrance mix I and where is it found?\\nFragrances can be found in most products, especially cosmetics, as they are used to add\\nscent or flavor. Fragrance Mix contains cinnamic alcohol, cinnamic aldehyde,\\nhydroxycitronellal, amylcinnamaldehyde, geraniol, euginol, isoeuginol, and oakmosse\\nabsolute; chemicals which are commonly used in cosmetics, laundry detergents, fabric\\nsofteners, air fresheners, toothpastes, perfumes and other personal hygiene products.\\nFurther research may identify additional product or industrial usages of this chemical.\\nWhat else is Fragrance mix I called?\\nThis chemical can be identified by different names, including:\\nAmylcinnamaldehyde, Geranyl Alcohol, [place] Aldehyde, Hydroxycitronellal, Cinnamic Alcohol, Isoeuginol, Cinnamic\\nAldehyde, Mariza Aldehyde, Euginol, [place] Synthetic, Geraniol, Oakmosse Absolute\\nThis may not be a complete list as manufacturers introduce and delete chemicals from their product lines.\\nTHINGS YOU CAN DO TO HELP MANAGE YOUR CONTACT ALLERGY\\n Be vigilant... read the product label. Always take the time to read the ingredient listing on product packages.\\nThis should be your first step each time you purchase a product as manufacturers sometimes change product\\ningredients. If you have any concerns ask your pharmacist or your doctor.\\nTest the product first. If you have purchased a new product you should test it on a small skin area to see if you\\nget a reaction before using the product on larger skin areas.\\nAdvise people you obtain services from of your contact allergy. This should include people like your\\npharmacist, doctor, hairdresser, , , etc.\\nInform your employer if the source of your contact allergy is work related. You should identify the specific\\nsource of the chemical and take the necessary steps to avoid further exposure. Protective wear may be\\nadequate or you may need to make a change in your work activities. Both you and your employer benefit when\\nthe cause of your occupational dermatitis is eliminated.\\n\"Google\" it. The internet is an excellent source of ingredient information that can be searched by product, by\\ncompany and by specific chemical. Some helpful independent internet links\\ninclude: www.nlm.nih.gov/pubs/factsheets/factsheets.html (U.S. Dept. of Health and Human Services;\\nalphabetic list) www.nlm.nih.gov/pubs/factsheets/factsubj.html (U.S. Dept. of Health and Human Services;\\nsubject list) www.cosmeticsinfo.org (Cosmetic Industry Category Ingredient\\nDatabase) www.whatsinsidescjohnson.com (information on all [employer] product ingredients)\\nIf you have any future contact dermatitis concerns or questions, please call the doctor's office.\\nDISCLAIMER: Every effort is made to ensure the accuracy of the information provided herein. However, [employer] makes no warranties or representations of any kind as to its accuracy, currency or completeness. Such\\ninformation is provided for informational purposes only and is not meant to be a substitute for physician or health professional\\nadvice.\\nPowered by [employer] (www.tcpdf.org) Allergen 28 Counseling: PATIENT INFORMATION SHEET\\nFragrance mix I\\n(Mx-07)\\nYour patch testing results indicate that you have a contact allergy to Fragrance mix I . It\\nis important that you familiarize yourself with this chemical and take steps to avoid\\ncoming in contact with it.\\nWhat is Fragrance mix I and where is it found?\\nFragrances can be found in most products, especially cosmetics, as they are used to add\\nscent or flavor. Fragrance Mix contains cinnamic alcohol, cinnamic aldehyde,\\nhydroxycitronellal, amylcinnamaldehyde, geraniol, euginol, isoeuginol, and oakmosse\\nabsolute; chemicals which are commonly used in cosmetics, laundry detergents, fabric\\nsofteners, air fresheners, toothpastes, perfumes and other personal hygiene products.\\nFurther research may identify additional product or industrial usages of this chemical.\\nWhat else is Fragrance mix I called?\\nThis chemical can be identified by different names, including:\\nAmylcinnamaldehyde, Geranyl Alcohol, [place] Aldehyde, Hydroxycitronellal, Cinnamic Alcohol, Isoeuginol, Cinnamic\\nAldehyde, Mariza Aldehyde, Euginol, [place] Synthetic, Geraniol, Oakmosse Absolute\\nThis may not be a complete list as manufacturers introduce and delete chemicals from their product lines.\\nTHINGS YOU CAN DO TO HELP MANAGE YOUR CONTACT ALLERGY\\n Be vigilant... read the product label. Always take the time to read the ingredient listing on product packages.\\nThis should be your first step each time you purchase a product as manufacturers sometimes change product\\ningredients. If you have any concerns ask your pharmacist or your doctor.\\nTest the product first. If you have purchased a new product you should test it on a small skin area to see if you\\nget a reaction before using the product on larger skin areas.\\nAdvise people you obtain services from of your contact allergy. This should include people like your\\npharmacist, doctor, hairdresser, , , etc.\\nInform your employer if the source of your contact allergy is work related. You should identify the specific\\nsource of the chemical and take the necessary steps to avoid further exposure. Protective wear may be\\nadequate or you may need to make a change in your work activities. Both you and your employer benefit when\\nthe cause of your occupational dermatitis is eliminated.\\n\"Google\" it. The internet is an excellent source of ingredient information that can be searched by product, by\\ncompany and by specific chemical. Some helpful independent internet links\\ninclude: www.nlm.nih.gov/pubs/factsheets/factsheets.html (U.S. Dept. of Health and Human Services;\\nalphabetic list) www.nlm.nih.gov/pubs/factsheets/factsubj.html (U.S. Dept. of Health and Human Services;\\nsubject list) www.cosmeticsinfo.org (Cosmetic Industry Category Ingredient\\nDatabase) www.whatsinsidescjohnson.com (information on all [employer] product ingredients)\\nIf you have any future contact dermatitis concerns or questions, please call the doctor's office.\\nDISCLAIMER: Every effort is made to ensure the accuracy of the information provided herein. However, [employer] makes no warranties or representations of any kind as to its accuracy, currency or completeness. Such\\ninformation is provided for informational purposes only and is not meant to be a substitute for physician or health professional\\nadvice.\\nPowered by [employer] (www.tcpdf.org)

## 2020-09-29 NOTE — PROCEDURE: ACDS EXTENDED PATCH TEST READING
Allergen 67 Counseling: PATIENT INFORMATION SHEET\\nTHIMEROSAL\\n(T-007)\\nYour patch testing results indicate that you have a contact allergy to THIMEROSAL. It is\\nimportant that you familiarize yourself with this chemical and take steps to avoid coming\\cristóbal contact with it.\\nWhat is THIMEROSAL and where is it found?\\nThis compound is used as an ophthalmic preservative, a topical anti‐ infective\\nand a topical veterinary antibacterial and antifungal agent. It can be found in contact lens\\nsolutions and cosmetic products like eye makeup. Further research may identify\\nadditional product or industrial usages of this chemical.\\nWhat else is THIMEROSAL called?\\nThis chemical can be identified by different names, including:\\nElcide\\nEthylmercurithiosalicylic acid sodium salt\\nMercurothiolate\\nMerfamin\\nMerseptyl\\nMerthiolate\\nMertorgan\\nMerzonin\\nMerzonin sodium\\n[place]\\nSET\\nSodium ethylmercuric thiosalicylate\\nThiomersalate\\nThis may not be a complete list as manufacturers introduce and delete chemicals from their product lines.\\nTHINGS YOU CAN DO TO HELP MANAGE YOUR CONTACT ALLERGY\\n Be vigilant... read the product label. Always take the time to read the ingredient listing on product packages.\\nThis should be your first step each time you purchase a product as manufacturers sometimes change product\\ningredients. If you have any concerns ask your pharmacist or your doctor.\\nTest the product first. If you have purchased a new product you should test it on a small skin area to see if you\\nget a reaction before using the product on larger skin areas.\\nAdvise people you obtain services from of your contact allergy. This should include people like your\\npharmacist, doctor, hairdresser, , , etc.\\nInform your employer if the source of your contact allergy is work related. You should identify the specific\\nsource of the chemical and take the necessary steps to avoid further exposure. Protective wear may be\\nadequate or you may need to make a change in your work activities. Both you and your employer benefit when\\nthe cause of your occupational dermatitis is eliminated.\\n\"Google\" it. The internet is an excellent source of ingredient information that can be searched by product, by\\ncompany and by specific chemical. Some helpful independent internet links\\ninclude: www.nlm.nih.gov/pubs/factsheets/factsheets.html (U.S. Dept. of Health and Human Services;\\nalphabetic list) www.nlm.nih.gov/pubs/factsheets/factsubj.html (U.S. Dept. of Health and Human Services;\\nsubject list) www.cosmeticsinfo.org (Cosmetic Industry Category Ingredient\\nDatabase) www.whatsinsidescjohnson.com (information on all [employer] product ingredients)\\nIf you have any future contact dermatitis concerns or questions, please call the doctor's office.\\nDISCLAIMER: Every effort is made to ensure the accuracy of the information provided herein. However, [employer] makes no warranties or representations of any kind as to its accuracy, currency or completeness. Such\\ninformation is provided for informational purposes only and is not meant to be a substitute for physician or health professional\\nadvice.\\nPowered by [employer] (www.tcpdf.org)

## 2020-09-29 NOTE — PROCEDURE: ACDS EXTENDED PATCH TEST READING
Allergen 32 Counseling: PATIENT INFORMATION SHEET\\nBENZYL ALCOHOL\\n(B-008, B-008B)\\nYour patch testing results indicate that you have a contact allergy to BENZYL\\nALCOHOL. It is important that you familiarize yourself with this chemical and take steps\\nto avoid coming in contact with it.\\nWhat is BENZYL ALCOHOL and where is it found?\\nThis compound is used in photographic development, perfumes, the food industry,\\npharmaceuticals as a bacterio-static, cosmetics, ointments, emulsions, textiles, sheet\\nplastics and inks. It is also used as a solvent for dyestuffs, cellulose esters, cellulose\\nacetate, casein, gelatin, waxes, shellac and insect repellents. Further research may\\nidentify additional product or industrial usages of this chemical.\\nWhat else is BENZYL ALCOHOL called?\\nThis chemical can be identified by different names, including:\\nAlpha‐Hydroxytoluene\\nAlpha‐toluenol\\nBenzoyl alcohol\\nBenzenemethanol\\nBenzenecarbinol\\nHydroxytoluene\\n(hydroxymethyl)benzene\\nPhenylcarbinol\\nPhenylmethyl alcohol\\nPhenylmethanol\\nThis may not be a complete list as manufacturers introduce and delete chemicals from their product lines.\\nTHINGS YOU CAN DO TO HELP MANAGE YOUR CONTACT ALLERGY\\n Be vigilant... read the product label. Always take the time to read the ingredient listing on product packages.\\nThis should be your first step each time you purchase a product as manufacturers sometimes change product\\ningredients. If you have any concerns ask your pharmacist or your doctor.\\nTest the product first. If you have purchased a new product you should test it on a small skin area to see if you\\nget a reaction before using the product on larger skin areas.\\nAdvise people you obtain services from of your contact allergy. This should include people like your\\npharmacist, doctor, hairdresser, , , etc.\\nInform your employer if the source of your contact allergy is work related. You should identify the specific\\nsource of the chemical and take the necessary steps to avoid further exposure. Protective wear may be\\nadequate or you may need to make a change in your work activities. Both you and your employer benefit when\\nthe cause of your occupational dermatitis is eliminated.\\n\"Google\" it. The internet is an excellent source of ingredient information that can be searched by product, by\\ncompany and by specific chemical. Some helpful independent internet links\\ninclude: www.nlm.nih.gov/pubs/factsheets/factsheets.html (U.S. Dept. of Health and Human Services;\\nalphabetic list) www.nlm.nih.gov/pubs/factsheets/factsubj.html (U.S. Dept. of Health and Human Services;\\nsubject list) www.cosmeticsinfo.org (Cosmetic Industry Category Ingredient\\nDatabase) www.whatsinsidescjohnson.com (information on all [employer] product ingredients)\\nIf you have any future contact dermatitis concerns or questions, please call the doctor's office.\\nDISCLAIMER: Every effort is made to ensure the accuracy of the information provided herein. However, [employer] makes no warranties or representations of any kind as to its accuracy, currency or completeness. Such\\ninformation is provided for informational purposes only and is not meant to be a substitute for physician or health professional\\nadvice.\\nPowered by [employer] (www.tcpdf.org)

## 2020-09-29 NOTE — PROCEDURE: PATIENT SPECIFIC COUNSELING
Detail Level: Generalized
Since eruption primarily areas not covered by clothing.  Suggest patient wear clothing that covers her upper chest and forearms.   She noted  she is likely more uncomfortable wearing loose long length clothing than from her eruption   She will consider complying

## 2020-10-28 ENCOUNTER — APPOINTMENT (RX ONLY)
Dept: URBAN - METROPOLITAN AREA CLINIC 61 | Facility: CLINIC | Age: 70
Setting detail: DERMATOLOGY
End: 2020-10-28

## 2020-10-28 DIAGNOSIS — L29.89 OTHER PRURITUS: ICD-10-CM | Status: STABLE

## 2020-10-28 DIAGNOSIS — L259 CONTACT DERMATITIS AND OTHER ECZEMA, UNSPECIFIED CAUSE: ICD-10-CM | Status: IMPROVED

## 2020-10-28 DIAGNOSIS — L29.8 OTHER PRURITUS: ICD-10-CM | Status: STABLE

## 2020-10-28 PROBLEM — L23.9 ALLERGIC CONTACT DERMATITIS, UNSPECIFIED CAUSE: Status: ACTIVE | Noted: 2020-10-28

## 2020-10-28 PROCEDURE — ? COUNSELING

## 2020-10-28 PROCEDURE — ? COUNSELING ALLERGENS

## 2020-10-28 PROCEDURE — ? PRESCRIPTION MEDICATION MANAGEMENT

## 2020-10-28 PROCEDURE — ? FOLLOW UP FOR NEXT VISIT

## 2020-10-28 PROCEDURE — ? FULL BODY SKIN EXAM - DECLINED

## 2020-10-28 PROCEDURE — 99213 OFFICE O/P EST LOW 20 MIN: CPT

## 2020-10-28 PROCEDURE — ? ORDER TESTS

## 2020-10-28 ASSESSMENT — LOCATION DETAILED DESCRIPTION DERM
LOCATION DETAILED: RIGHT VENTRAL DISTAL FOREARM
LOCATION DETAILED: RIGHT VENTRAL PROXIMAL FOREARM
LOCATION DETAILED: RIGHT PROXIMAL RADIAL DORSAL FOREARM
LOCATION DETAILED: RIGHT ANTERIOR DISTAL UPPER ARM
LOCATION DETAILED: LEFT PROXIMAL DORSAL FOREARM
LOCATION DETAILED: UPPER STERNUM
LOCATION DETAILED: LEFT VENTRAL PROXIMAL FOREARM

## 2020-10-28 ASSESSMENT — LOCATION SIMPLE DESCRIPTION DERM
LOCATION SIMPLE: RIGHT FOREARM
LOCATION SIMPLE: CHEST
LOCATION SIMPLE: LEFT FOREARM
LOCATION SIMPLE: RIGHT UPPER ARM

## 2020-10-28 ASSESSMENT — LOCATION ZONE DERM
LOCATION ZONE: TRUNK
LOCATION ZONE: ARM
LOCATION ZONE: ARM

## 2020-10-28 ASSESSMENT — SEVERITY ASSESSMENT: SEVERITY: MILD

## 2020-10-28 NOTE — PROCEDURE: PRESCRIPTION MEDICATION MANAGEMENT
Continue Regimen: , cyproheptadine prn
Modify Regimen: Fluocinolone ointment increase application from bid to qid until clear
Detail Level: Generalized
Plan: Wear long sleeves to prevent incidental scratching \\nUnplug wall fragrances
Render In Strict Bullet Format?: No

## 2020-10-28 NOTE — PROCEDURE: FOLLOW UP FOR NEXT VISIT
Other Results: pending lab results
Detail Level: Generalized
Other Results: and other labs as ordered

## 2020-12-02 ENCOUNTER — APPOINTMENT (RX ONLY)
Dept: URBAN - METROPOLITAN AREA CLINIC 61 | Facility: CLINIC | Age: 70
Setting detail: DERMATOLOGY
End: 2020-12-02

## 2020-12-02 DIAGNOSIS — Z78.9 OTHER SPECIFIED HEALTH STATUS: ICD-10-CM

## 2020-12-02 DIAGNOSIS — L29.8 OTHER PRURITUS: ICD-10-CM | Status: INADEQUATELY CONTROLLED

## 2020-12-02 DIAGNOSIS — L259 CONTACT DERMATITIS AND OTHER ECZEMA, UNSPECIFIED CAUSE: ICD-10-CM | Status: INADEQUATELY CONTROLLED

## 2020-12-02 DIAGNOSIS — L29.89 OTHER PRURITUS: ICD-10-CM | Status: INADEQUATELY CONTROLLED

## 2020-12-02 PROBLEM — L23.9 ALLERGIC CONTACT DERMATITIS, UNSPECIFIED CAUSE: Status: ACTIVE | Noted: 2020-12-02

## 2020-12-02 PROCEDURE — 99213 OFFICE O/P EST LOW 20 MIN: CPT

## 2020-12-02 PROCEDURE — ? FOLLOW UP FOR NEXT VISIT

## 2020-12-02 PROCEDURE — ? COUNSELING

## 2020-12-02 PROCEDURE — ? FULL BODY SKIN EXAM - DECLINED

## 2020-12-02 PROCEDURE — ? LAB REPORTS REVIEWED

## 2020-12-02 PROCEDURE — ? COUNSELING ALLERGENS

## 2020-12-02 PROCEDURE — ? PRESCRIPTION MEDICATION MANAGEMENT

## 2020-12-02 ASSESSMENT — LOCATION DETAILED DESCRIPTION DERM
LOCATION DETAILED: LEFT PROXIMAL DORSAL FOREARM
LOCATION DETAILED: RIGHT VENTRAL DISTAL FOREARM
LOCATION DETAILED: RIGHT ANTERIOR DISTAL UPPER ARM
LOCATION DETAILED: LEFT VENTRAL PROXIMAL FOREARM
LOCATION DETAILED: UPPER STERNUM
LOCATION DETAILED: RIGHT VENTRAL PROXIMAL FOREARM
LOCATION DETAILED: RIGHT PROXIMAL RADIAL DORSAL FOREARM

## 2020-12-02 ASSESSMENT — ITCH INTENSITY: HOW SEVERE IS YOUR ITCHING?: 5

## 2020-12-02 ASSESSMENT — LOCATION SIMPLE DESCRIPTION DERM
LOCATION SIMPLE: CHEST
LOCATION SIMPLE: RIGHT UPPER ARM
LOCATION SIMPLE: LEFT FOREARM
LOCATION SIMPLE: RIGHT FOREARM

## 2020-12-02 ASSESSMENT — LOCATION ZONE DERM
LOCATION ZONE: ARM
LOCATION ZONE: ARM
LOCATION ZONE: TRUNK

## 2020-12-02 ASSESSMENT — SEVERITY ASSESSMENT: SEVERITY: MODERATE

## 2020-12-02 ASSESSMENT — BSA RASH: BSA RASH: 5

## 2020-12-02 NOTE — PROCEDURE: LAB REPORTS REVIEWED
Summarized Lab Results: Pruritus-\\nAbnormal labs c/w pancreatic insufficiency, needs w/u by PMD or endocrinologist or GI\\n\\nAnemia\\nLow Ca\\nLow Vitamin D\\nLow B12\\nLow Fe\\n\\nGlucose and HgA1C - better diet associated with pancreas
Labs Reviewed Override: TSH+Free T4, CBC, CMP, Calcium, Lipid panel, iron studies, Vitamin D, Vitamin B12 and folate, Vitamin Bs, Hemoglobin Alc, methylmalonic acid, homocysteine, GGT
Detail Level: Detailed

## 2020-12-02 NOTE — PROCEDURE: FOLLOW UP FOR NEXT VISIT
Detail Level: Zone
Other Results: evaluation and management by PMD and ? endocrinologist and/or gastroenterologist

## 2020-12-02 NOTE — PROCEDURE: PRESCRIPTION MEDICATION MANAGEMENT
Initiate Treatment: Claritin BID
Modify Regimen: Fluocinolone ointment increase application from bid to qid until clear
Detail Level: Generalized
Plan: Wear long sleeves to prevent incidental scratching \\nUnplug wall fragrances\\n\\nPatient declines using cyproheptadine due to side effects
Render In Strict Bullet Format?: No
Detail Level: Detailed
Initiate Treatment: 65 mg essential iron QD, B12 - 2500 ug sublingual QD, Vitamin D3 5,000 units, Vitamin D2 2,000 units, Calcium 1000mg, Vitamin B6  mg QD
Otc Regimen: Add. Vitamins according to those found deficient in labs /\\nAmylase supplements.  - ?

## 2021-02-03 ENCOUNTER — APPOINTMENT (RX ONLY)
Dept: URBAN - METROPOLITAN AREA CLINIC 61 | Facility: CLINIC | Age: 71
Setting detail: DERMATOLOGY
End: 2021-02-03

## 2021-02-03 DIAGNOSIS — L29.89 OTHER PRURITUS: ICD-10-CM | Status: IMPROVED

## 2021-02-03 DIAGNOSIS — Z78.9 OTHER SPECIFIED HEALTH STATUS: ICD-10-CM

## 2021-02-03 DIAGNOSIS — L259 CONTACT DERMATITIS AND OTHER ECZEMA, UNSPECIFIED CAUSE: ICD-10-CM | Status: IMPROVED

## 2021-02-03 DIAGNOSIS — L29.8 OTHER PRURITUS: ICD-10-CM | Status: IMPROVED

## 2021-02-03 PROBLEM — L23.9 ALLERGIC CONTACT DERMATITIS, UNSPECIFIED CAUSE: Status: ACTIVE | Noted: 2021-02-03

## 2021-02-03 PROCEDURE — ? FOLLOW UP FOR NEXT VISIT

## 2021-02-03 PROCEDURE — ? RECOMMENDATIONS

## 2021-02-03 PROCEDURE — ? PRESCRIPTION MEDICATION MANAGEMENT

## 2021-02-03 PROCEDURE — ? COUNSELING ALLERGENS

## 2021-02-03 PROCEDURE — 99213 OFFICE O/P EST LOW 20 MIN: CPT

## 2021-02-03 PROCEDURE — ? COUNSELING

## 2021-02-03 PROCEDURE — ? FULL BODY SKIN EXAM - DECLINED

## 2021-02-03 ASSESSMENT — SEVERITY ASSESSMENT: SEVERITY: ALMOST CLEAR

## 2021-02-03 ASSESSMENT — LOCATION DETAILED DESCRIPTION DERM
LOCATION DETAILED: RIGHT PROXIMAL RADIAL DORSAL FOREARM
LOCATION DETAILED: LEFT VENTRAL PROXIMAL FOREARM
LOCATION DETAILED: RIGHT VENTRAL PROXIMAL FOREARM
LOCATION DETAILED: RIGHT VENTRAL DISTAL FOREARM
LOCATION DETAILED: RIGHT ANTERIOR DISTAL UPPER ARM
LOCATION DETAILED: LEFT PROXIMAL DORSAL FOREARM
LOCATION DETAILED: UPPER STERNUM

## 2021-02-03 ASSESSMENT — LOCATION ZONE DERM
LOCATION ZONE: ARM
LOCATION ZONE: ARM
LOCATION ZONE: TRUNK

## 2021-02-03 ASSESSMENT — LOCATION SIMPLE DESCRIPTION DERM
LOCATION SIMPLE: RIGHT FOREARM
LOCATION SIMPLE: LEFT FOREARM
LOCATION SIMPLE: RIGHT UPPER ARM
LOCATION SIMPLE: CHEST

## 2021-02-03 ASSESSMENT — ITCH INTENSITY: HOW SEVERE IS YOUR ITCHING?: 2

## 2021-02-03 NOTE — PROCEDURE: PRESCRIPTION MEDICATION MANAGEMENT
Continue Regimen: Claritin BID and Fluocinolone ointment qid until clear
Detail Level: Generalized
Plan: Wear long sleeves to prevent incidental scratching \\nUnplug wall fragrances\\n\\nPatient declines using cyproheptadine due to side effects
Render In Strict Bullet Format?: No
Otc Regimen: Add. Vitamins according to those found deficient in labs /\\nAmylase supplements.  - ?
Detail Level: Detailed
Continue Regimen: 65 mg essential iron QD, B12 - 2500 ug sublingual QD, Vitamin D3 5,000 units, Vitamin D2 2,000 units, Calcium 1000mg, Vitamin B6  mg QD

## 2021-02-03 NOTE — PROCEDURE: FOLLOW UP FOR NEXT VISIT
Detail Level: Zone
Other Results: evaluation and management by PMD and ? endocrinologist and/or gastroenterologist
Other Diagnostics: send in new order of bloodwork to recheck abnormalities
Detail Level: Generalized

## 2023-01-06 ENCOUNTER — APPOINTMENT (RX ONLY)
Dept: URBAN - METROPOLITAN AREA CLINIC 61 | Facility: CLINIC | Age: 73
Setting detail: DERMATOLOGY
End: 2023-01-06

## 2023-01-06 DIAGNOSIS — L259 CONTACT DERMATITIS AND OTHER ECZEMA, UNSPECIFIED CAUSE: ICD-10-CM | Status: WORSENING

## 2023-01-06 DIAGNOSIS — L01.01 NON-BULLOUS IMPETIGO: ICD-10-CM | Status: INADEQUATELY CONTROLLED

## 2023-01-06 PROBLEM — L23.89 ALLERGIC CONTACT DERMATITIS DUE TO OTHER AGENTS: Status: ACTIVE | Noted: 2023-01-06

## 2023-01-06 PROCEDURE — ? COUNSELING

## 2023-01-06 PROCEDURE — ? PRESCRIPTION MEDICATION MANAGEMENT

## 2023-01-06 PROCEDURE — ? COUNSELING ALLERGENS

## 2023-01-06 PROCEDURE — ? FULL BODY SKIN EXAM - DECLINED

## 2023-01-06 PROCEDURE — ? PRESCRIPTION

## 2023-01-06 PROCEDURE — ? FOLLOW UP FOR NEXT VISIT

## 2023-01-06 PROCEDURE — ? EDUCATIONAL RESOURCES PROVIDED

## 2023-01-06 PROCEDURE — 99214 OFFICE O/P EST MOD 30 MIN: CPT

## 2023-01-06 RX ORDER — PREDNISONE 50 MG/1
TABLET ORAL
Qty: 14 | Refills: 1 | Status: ERX | COMMUNITY
Start: 2023-01-06

## 2023-01-06 RX ORDER — FLUOCINOLONE ACETONIDE 0.25 MG/G
OINTMENT TOPICAL
Qty: 60 | Refills: 0 | Status: ERX | COMMUNITY
Start: 2023-01-06

## 2023-01-06 RX ORDER — DOXYCYCLINE HYCLATE 100 MG/1
CAPSULE, GELATIN COATED ORAL BID
Qty: 28 | Refills: 1 | Status: ERX | COMMUNITY
Start: 2023-01-06

## 2023-01-06 RX ADMIN — DOXYCYCLINE HYCLATE: 100 CAPSULE, GELATIN COATED ORAL at 00:00

## 2023-01-06 RX ADMIN — PREDNISONE: 50 TABLET ORAL at 00:00

## 2023-01-06 RX ADMIN — FLUOCINOLONE ACETONIDE: 0.25 OINTMENT TOPICAL at 00:00

## 2023-01-06 ASSESSMENT — LOCATION DETAILED DESCRIPTION DERM
LOCATION DETAILED: RIGHT DISTAL PRETIBIAL REGION
LOCATION DETAILED: LEFT DISTAL PRETIBIAL REGION

## 2023-01-06 ASSESSMENT — BSA RASH: BSA RASH: 10

## 2023-01-06 ASSESSMENT — LOCATION SIMPLE DESCRIPTION DERM
LOCATION SIMPLE: RIGHT PRETIBIAL REGION
LOCATION SIMPLE: LEFT PRETIBIAL REGION

## 2023-01-06 ASSESSMENT — LOCATION ZONE DERM: LOCATION ZONE: LEG

## 2023-01-06 ASSESSMENT — SEVERITY ASSESSMENT: SEVERITY: MODERATE TO SEVERE

## 2023-01-06 NOTE — PROCEDURE: PRESCRIPTION MEDICATION MANAGEMENT
Detail Level: Zone
Render In Strict Bullet Format?: No
Initiate Treatment: Fluocinolone, Doxycycline and prednisone 50mg Qam
Plan: Continue Aquanil \\nChange laundry to Arm and Hammer Oxy Free\\nAvoid fragrance allergy
Discontinue Regimen: exposures topical  inhaled and ingested to fragrance
Initiate Treatment: prednisone  50 mg q am \\nluocinolone 0.025 % topical ointment

## 2023-01-06 NOTE — HPI: RASH
What Type Of Note Output Would You Prefer (Optional)?: Standard Output
How Severe Is Your Rash?: moderate
Is This A New Presentation, Or A Follow-Up?: Rash
Additional History: Patient washes body with Aquanil and clothing with ALL Free and Clear.

## 2023-05-18 ENCOUNTER — APPOINTMENT (RX ONLY)
Dept: URBAN - METROPOLITAN AREA CLINIC 61 | Facility: CLINIC | Age: 73
Setting detail: DERMATOLOGY
End: 2023-05-18

## 2023-05-18 DIAGNOSIS — I87.2 VENOUS INSUFFICIENCY (CHRONIC) (PERIPHERAL): ICD-10-CM | Status: INADEQUATELY CONTROLLED

## 2023-05-18 DIAGNOSIS — B35.3 TINEA PEDIS: ICD-10-CM | Status: INADEQUATELY CONTROLLED

## 2023-05-18 PROBLEM — L08.9 LOCAL INFECTION OF THE SKIN AND SUBCUTANEOUS TISSUE, UNSPECIFIED: Status: ACTIVE | Noted: 2023-05-18

## 2023-05-18 PROCEDURE — ? PRESCRIPTION

## 2023-05-18 PROCEDURE — 99214 OFFICE O/P EST MOD 30 MIN: CPT

## 2023-05-18 PROCEDURE — ? FULL BODY SKIN EXAM - DECLINED

## 2023-05-18 PROCEDURE — ? COUNSELING

## 2023-05-18 RX ORDER — TERBINAFINE HYDROCHLORIDE 250 MG/1
TABLET ORAL QD
Qty: 14 | Refills: 0 | Status: ERX | COMMUNITY
Start: 2023-05-18

## 2023-05-18 RX ORDER — TRIAMCINOLONE ACETONIDE 1 MG/G
CREAM TOPICAL BID
Qty: 453.6 | Refills: 6 | Status: ERX | COMMUNITY
Start: 2023-05-18

## 2023-05-18 RX ADMIN — TRIAMCINOLONE ACETONIDE: 1 CREAM TOPICAL at 00:00

## 2023-05-18 RX ADMIN — TERBINAFINE HYDROCHLORIDE: 250 TABLET ORAL at 00:00

## 2023-05-18 ASSESSMENT — LOCATION ZONE DERM
LOCATION ZONE: TOENAIL
LOCATION ZONE: TOE
LOCATION ZONE: LEG
LOCATION ZONE: FEET

## 2023-05-18 ASSESSMENT — LOCATION DETAILED DESCRIPTION DERM
LOCATION DETAILED: LEFT DORSAL GREAT TOE
LOCATION DETAILED: RIGHT ANKLE
LOCATION DETAILED: RIGHT DISTAL PRETIBIAL REGION
LOCATION DETAILED: LEFT DORSAL FOOT
LOCATION DETAILED: RIGHT GREAT TOENAIL
LOCATION DETAILED: LEFT DISTAL PRETIBIAL REGION

## 2023-05-18 ASSESSMENT — LOCATION SIMPLE DESCRIPTION DERM
LOCATION SIMPLE: RIGHT GREAT TOE
LOCATION SIMPLE: LEFT PRETIBIAL REGION
LOCATION SIMPLE: LEFT GREAT TOE
LOCATION SIMPLE: LEFT FOOT
LOCATION SIMPLE: RIGHT ANKLE
LOCATION SIMPLE: RIGHT PRETIBIAL REGION

## 2023-05-18 ASSESSMENT — SEVERITY ASSESSMENT
SEVERITY: MODERATE TO SEVERE
SEVERITY: MODERATE

## 2023-05-18 ASSESSMENT — PAIN INTENSITY VAS: HOW INTENSE IS YOUR PAIN 0 BEING NO PAIN, 10 BEING THE MOST SEVERE PAIN POSSIBLE?: 1/10 PAIN

## 2025-05-07 ENCOUNTER — APPOINTMENT (OUTPATIENT)
Dept: URBAN - METROPOLITAN AREA CLINIC 61 | Facility: CLINIC | Age: 75
Setting detail: DERMATOLOGY
End: 2025-05-07

## 2025-05-07 DIAGNOSIS — L82.0 INFLAMED SEBORRHEIC KERATOSIS: ICD-10-CM | Status: INADEQUATELY CONTROLLED

## 2025-05-07 DIAGNOSIS — L57.0 ACTINIC KERATOSIS: ICD-10-CM | Status: INADEQUATELY CONTROLLED

## 2025-05-07 DIAGNOSIS — L57.8 OTHER SKIN CHANGES DUE TO CHRONIC EXPOSURE TO NONIONIZING RADIATION: ICD-10-CM

## 2025-05-07 DIAGNOSIS — L82.1 OTHER SEBORRHEIC KERATOSIS: ICD-10-CM | Status: STABLE

## 2025-05-07 PROBLEM — D48.5 NEOPLASM OF UNCERTAIN BEHAVIOR OF SKIN: Status: ACTIVE | Noted: 2025-05-07

## 2025-05-07 PROCEDURE — ? TREATMENT REGIMEN

## 2025-05-07 PROCEDURE — ? LIQUID NITROGEN

## 2025-05-07 PROCEDURE — ? COUNSELING

## 2025-05-07 PROCEDURE — 17000 DESTRUCT PREMALG LESION: CPT | Mod: 59

## 2025-05-07 PROCEDURE — 99213 OFFICE O/P EST LOW 20 MIN: CPT | Mod: 25

## 2025-05-07 PROCEDURE — ? FULL BODY SKIN EXAM - DECLINED

## 2025-05-07 PROCEDURE — ? BIOPSY BY SHAVE METHOD

## 2025-05-07 PROCEDURE — 11102 TANGNTL BX SKIN SINGLE LES: CPT

## 2025-05-07 ASSESSMENT — LOCATION SIMPLE DESCRIPTION DERM
LOCATION SIMPLE: LEFT FOREARM
LOCATION SIMPLE: LEFT CHEEK
LOCATION SIMPLE: CHEST

## 2025-05-07 ASSESSMENT — LOCATION ZONE DERM
LOCATION ZONE: TRUNK
LOCATION ZONE: ARM
LOCATION ZONE: FACE

## 2025-05-07 ASSESSMENT — LOCATION DETAILED DESCRIPTION DERM
LOCATION DETAILED: LEFT CENTRAL MALAR CHEEK
LOCATION DETAILED: LEFT PROXIMAL DORSAL FOREARM
LOCATION DETAILED: UPPER STERNUM
LOCATION DETAILED: RIGHT MEDIAL SUPERIOR CHEST